# Patient Record
Sex: MALE | Race: WHITE
[De-identification: names, ages, dates, MRNs, and addresses within clinical notes are randomized per-mention and may not be internally consistent; named-entity substitution may affect disease eponyms.]

---

## 2021-06-30 ENCOUNTER — HOSPITAL ENCOUNTER (OUTPATIENT)
Dept: HOSPITAL 53 - M RAD | Age: 41
End: 2021-06-30
Attending: OTOLARYNGOLOGY
Payer: COMMERCIAL

## 2021-06-30 DIAGNOSIS — Z98.890: ICD-10-CM

## 2021-06-30 DIAGNOSIS — Q27.39: Primary | ICD-10-CM

## 2021-06-30 NOTE — REPVR
PROCEDURE INFORMATION: 

Exam: CT Neck With Contrast 

Exam date and time: 6/30/2021 8:44 AM 

Age: 41 years old 

Clinical indication: Other: Arteriovenous malformation; Prior surgery; Surgery 

date: 6+ months 



TECHNIQUE: 

Imaging protocol: Computed tomography images of the neck with contrast. 

Radiation optimization: All CT scans at this facility use at least one of these 

dose optimization techniques: automated exposure control; mA and/or kV 

adjustment per patient size (includes targeted exams where dose is matched to 

clinical indication); or iterative reconstruction. 

Contrast material: ISOVUE 370; Contrast volume: 75 ml; Contrast route: 

INTRAVENOUS (IV);  



COMPARISON: 

No relevant prior studies available. 



FINDINGS: 

Nasopharynx: Unremarkable. 

Oropharynx: Unremarkable. No significant tonsillar enlargement. 

Hypopharynx: Unremarkable. 

Larynx: Unremarkable. Normal epiglottis. 

Retropharyngeal space: Unremarkable. 

Submandibular/Parotid glands: Multiple branching high densities (with streak 

artifacts) throughout the tongue and infralingual and bilateral submandibular 

soft tissues, predominating on the left consistent with prior endovascular 

therapy. No residual vascular malformation visualized within the area of prior 

therapy. 

Thyroid: Normal. No enlarged or calcified nodules.  

Lymph nodes: Unremarkable. No lymphadenopathy. 



Trachea: Visualized trachea is unremarkable. 

Lungs: Unremarkable as visualized. 

Bones/joints: Unremarkable. No acute fracture. 

Vasculature: Mild residual prominence of right external carotid artery branches 

and a right submandibular vein. 

Soft tissues: No significant soft tissue swelling. 



IMPRESSION: 

Prior endovascular therapy. No residual vascular malformation identified as 

visualized. 



Electronically signed by: Tay Maurice On 06/30/2021  09:16:52 AM

## 2021-07-06 ENCOUNTER — HOSPITAL ENCOUNTER (OUTPATIENT)
Dept: HOSPITAL 53 - M RAD | Age: 41
End: 2021-07-06
Attending: OTOLARYNGOLOGY
Payer: COMMERCIAL

## 2021-07-06 DIAGNOSIS — Z53.9: ICD-10-CM

## 2021-07-06 DIAGNOSIS — Q27.39: Primary | ICD-10-CM

## 2022-08-15 ENCOUNTER — HOSPITAL ENCOUNTER (OUTPATIENT)
Dept: HOSPITAL 53 - M RAD | Age: 42
End: 2022-08-15
Attending: OTOLARYNGOLOGY
Payer: COMMERCIAL

## 2022-08-15 DIAGNOSIS — Q27.39: Primary | ICD-10-CM

## 2022-08-15 PROCEDURE — 70491 CT SOFT TISSUE NECK W/DYE: CPT
